# Patient Record
Sex: MALE | Race: WHITE | NOT HISPANIC OR LATINO | Employment: FULL TIME | URBAN - METROPOLITAN AREA
[De-identification: names, ages, dates, MRNs, and addresses within clinical notes are randomized per-mention and may not be internally consistent; named-entity substitution may affect disease eponyms.]

---

## 2024-08-21 ENCOUNTER — HOSPITAL ENCOUNTER (EMERGENCY)
Facility: HOSPITAL | Age: 24
Discharge: HOME | End: 2024-08-21
Attending: EMERGENCY MEDICINE
Payer: COMMERCIAL

## 2024-08-21 VITALS
RESPIRATION RATE: 18 BRPM | SYSTOLIC BLOOD PRESSURE: 111 MMHG | HEIGHT: 64 IN | BODY MASS INDEX: 23.9 KG/M2 | HEART RATE: 59 BPM | DIASTOLIC BLOOD PRESSURE: 68 MMHG | WEIGHT: 140 LBS | OXYGEN SATURATION: 97 % | TEMPERATURE: 98.4 F

## 2024-08-21 DIAGNOSIS — R55 NEAR SYNCOPE: Primary | ICD-10-CM

## 2024-08-21 DIAGNOSIS — F41.9 ANXIETY: ICD-10-CM

## 2024-08-21 LAB
ALBUMIN SERPL-MCNC: 4.7 G/DL (ref 3.5–5.2)
ALBUMIN/GLOB SERPL: 2.2 G/DL
ALP SERPL-CCNC: 44 U/L (ref 39–117)
ALT SERPL W P-5'-P-CCNC: 12 U/L (ref 1–41)
ANION GAP SERPL CALCULATED.3IONS-SCNC: 11.2 MMOL/L (ref 5–15)
AST SERPL-CCNC: 18 U/L (ref 1–40)
BASOPHILS # BLD AUTO: 0.04 10*3/MM3 (ref 0–0.2)
BASOPHILS NFR BLD AUTO: 0.5 % (ref 0–1.5)
BILIRUB SERPL-MCNC: 0.6 MG/DL (ref 0–1.2)
BUN SERPL-MCNC: 14 MG/DL (ref 6–20)
BUN/CREAT SERPL: 14.7 (ref 7–25)
CALCIUM SPEC-SCNC: 9.1 MG/DL (ref 8.6–10.5)
CHLORIDE SERPL-SCNC: 104 MMOL/L (ref 98–107)
CO2 SERPL-SCNC: 24.8 MMOL/L (ref 22–29)
CREAT SERPL-MCNC: 0.95 MG/DL (ref 0.76–1.27)
DEPRECATED RDW RBC AUTO: 39.8 FL (ref 37–54)
EGFRCR SERPLBLD CKD-EPI 2021: 113.9 ML/MIN/1.73
EOSINOPHIL # BLD AUTO: 0.08 10*3/MM3 (ref 0–0.4)
EOSINOPHIL NFR BLD AUTO: 1.1 % (ref 0.3–6.2)
ERYTHROCYTE [DISTWIDTH] IN BLOOD BY AUTOMATED COUNT: 12.3 % (ref 12.3–15.4)
GLOBULIN UR ELPH-MCNC: 2.1 GM/DL
GLUCOSE SERPL-MCNC: 101 MG/DL (ref 65–99)
HCT VFR BLD AUTO: 41.7 % (ref 37.5–51)
HGB BLD-MCNC: 14.3 G/DL (ref 13–17.7)
IMM GRANULOCYTES # BLD AUTO: 0.02 10*3/MM3 (ref 0–0.05)
IMM GRANULOCYTES NFR BLD AUTO: 0.3 % (ref 0–0.5)
LYMPHOCYTES # BLD AUTO: 1.62 10*3/MM3 (ref 0.7–3.1)
LYMPHOCYTES NFR BLD AUTO: 21.9 % (ref 19.6–45.3)
MAGNESIUM SERPL-MCNC: 1.9 MG/DL (ref 1.6–2.6)
MCH RBC QN AUTO: 30.7 PG (ref 26.6–33)
MCHC RBC AUTO-ENTMCNC: 34.3 G/DL (ref 31.5–35.7)
MCV RBC AUTO: 89.5 FL (ref 79–97)
MONOCYTES # BLD AUTO: 0.66 10*3/MM3 (ref 0.1–0.9)
MONOCYTES NFR BLD AUTO: 8.9 % (ref 5–12)
NEUTROPHILS NFR BLD AUTO: 4.99 10*3/MM3 (ref 1.7–7)
NEUTROPHILS NFR BLD AUTO: 67.3 % (ref 42.7–76)
NRBC BLD AUTO-RTO: 0 /100 WBC (ref 0–0.2)
PLATELET # BLD AUTO: 300 10*3/MM3 (ref 140–450)
PMV BLD AUTO: 9.2 FL (ref 6–12)
POTASSIUM SERPL-SCNC: 3.7 MMOL/L (ref 3.5–5.2)
PROT SERPL-MCNC: 6.8 G/DL (ref 6–8.5)
QT INTERVAL: 403 MS
QTC INTERVAL: 406 MS
RBC # BLD AUTO: 4.66 10*6/MM3 (ref 4.14–5.8)
SODIUM SERPL-SCNC: 140 MMOL/L (ref 136–145)
T4 FREE SERPL-MCNC: 1.59 NG/DL (ref 0.92–1.68)
TSH SERPL DL<=0.05 MIU/L-ACNC: 0.99 UIU/ML (ref 0.27–4.2)
WBC NRBC COR # BLD AUTO: 7.41 10*3/MM3 (ref 3.4–10.8)

## 2024-08-21 PROCEDURE — 93005 ELECTROCARDIOGRAM TRACING: CPT | Performed by: EMERGENCY MEDICINE

## 2024-08-21 PROCEDURE — 93010 ELECTROCARDIOGRAM REPORT: CPT | Performed by: INTERNAL MEDICINE

## 2024-08-21 PROCEDURE — 99283 EMERGENCY DEPT VISIT LOW MDM: CPT

## 2024-08-21 PROCEDURE — 84439 ASSAY OF FREE THYROXINE: CPT | Performed by: EMERGENCY MEDICINE

## 2024-08-21 PROCEDURE — 80050 GENERAL HEALTH PANEL: CPT | Performed by: EMERGENCY MEDICINE

## 2024-08-21 PROCEDURE — 83735 ASSAY OF MAGNESIUM: CPT | Performed by: EMERGENCY MEDICINE

## 2024-08-21 NOTE — ED TRIAGE NOTES
Patient to ED via pv from home. Patient c/o multiple near syncopal episodes that have occurred over the past 2 weeks. Patient reports he was on his way here and had another episode and had to pull over. Patient reports he had a doctor appointment today to address this issue.

## 2024-08-21 NOTE — DISCHARGE INSTRUCTIONS
Follow-up with your primary care provider soon as possible.  Return to the emergency department for worsening/persistent symptoms, fainting, chest pain, shortness of breath, or other concern.

## 2024-08-21 NOTE — ED PROVIDER NOTES
EMERGENCY DEPARTMENT ENCOUNTER    Room Number:  15/15  PCP: Sharon Fernández APRN  Historian: Patient    I initially evaluated the patient at 1530    HPI:  Chief Complaint: Near syncope  A complete HPI/ROS/PMH/PSH/SH/FH are unobtainable due to: Nothing  Context: Abdirashid Remy is a 23 y.o. male who presents to the ED c/o acute near syncope.  Patient was driving earlier today when he began to feel faint and lightheaded.  He developed tunnel vision and tingling in both hands.  He also reports that the muscles in both arms felt tight.  He felt like his heart was racing and he had some mild tightness in his chest.  He also felt somewhat short of breath.  Symptoms lasted about 30 minutes.  He had a similar episode 2 days ago and another similar episode about 2 weeks ago.  He denies headache or abdominal pain.  He denies any aggravating or alleviating factors.  He denies recent illness, cough, fever, abdominal pain, vomiting, or diarrhea.  He smokes marijuana occasionally.  Denies other drug use.  He does not take any prescription medication.            PAST MEDICAL HISTORY  Active Ambulatory Problems     Diagnosis Date Noted    No Active Ambulatory Problems     Resolved Ambulatory Problems     Diagnosis Date Noted    No Resolved Ambulatory Problems     No Additional Past Medical History         PAST SURGICAL HISTORY  History reviewed. No pertinent surgical history.      FAMILY HISTORY  History reviewed. No pertinent family history.      SOCIAL HISTORY  Social History     Socioeconomic History    Marital status: Single         ALLERGIES  Patient has no known allergies.    REVIEW OF SYSTEMS  Review of Systems  Included in HPI  All systems reviewed and negative except for those discussed in HPI.      PHYSICAL EXAM  ED Triage Vitals [08/21/24 1434]   Temp Heart Rate Resp BP SpO2   98.4 °F (36.9 °C) 101 16 153/89 98 %      Temp src Heart Rate Source Patient Position BP Location FiO2 (%)   -- -- -- -- --       Physical  Exam      GENERAL: Awake, alert, oriented x 3.  Well-developed, well-nourished male.  Resting comfortably in no acute distress  HENT: NCAT, nares patent  EYES: PERRL, EOMI  CV: regular rhythm, normal rate  RESPIRATORY: normal effort, clear to auscultation bilaterally  ABDOMEN: soft, nondistended, nontender  MUSCULOSKELETAL: Extremities are nontender with full range of motion  NEURO: Speech is normal.  No facial droop.  Normal strength and light touch sensation in all extremities.  PSYCH:  calm, cooperative  SKIN: warm, dry    Vital signs and nursing notes reviewed.          LAB RESULTS  Recent Results (from the past 24 hour(s))   Comprehensive Metabolic Panel    Collection Time: 08/21/24  3:41 PM    Specimen: Blood   Result Value Ref Range    Glucose 101 (H) 65 - 99 mg/dL    BUN 14 6 - 20 mg/dL    Creatinine 0.95 0.76 - 1.27 mg/dL    Sodium 140 136 - 145 mmol/L    Potassium 3.7 3.5 - 5.2 mmol/L    Chloride 104 98 - 107 mmol/L    CO2 24.8 22.0 - 29.0 mmol/L    Calcium 9.1 8.6 - 10.5 mg/dL    Total Protein 6.8 6.0 - 8.5 g/dL    Albumin 4.7 3.5 - 5.2 g/dL    ALT (SGPT) 12 1 - 41 U/L    AST (SGOT) 18 1 - 40 U/L    Alkaline Phosphatase 44 39 - 117 U/L    Total Bilirubin 0.6 0.0 - 1.2 mg/dL    Globulin 2.1 gm/dL    A/G Ratio 2.2 g/dL    BUN/Creatinine Ratio 14.7 7.0 - 25.0    Anion Gap 11.2 5.0 - 15.0 mmol/L    eGFR 113.9 >60.0 mL/min/1.73   Magnesium    Collection Time: 08/21/24  3:41 PM    Specimen: Blood   Result Value Ref Range    Magnesium 1.9 1.6 - 2.6 mg/dL   TSH    Collection Time: 08/21/24  3:41 PM    Specimen: Blood   Result Value Ref Range    TSH 0.990 0.270 - 4.200 uIU/mL   T4, Free    Collection Time: 08/21/24  3:41 PM    Specimen: Blood   Result Value Ref Range    Free T4 1.59 0.92 - 1.68 ng/dL   CBC Auto Differential    Collection Time: 08/21/24  3:41 PM    Specimen: Blood   Result Value Ref Range    WBC 7.41 3.40 - 10.80 10*3/mm3    RBC 4.66 4.14 - 5.80 10*6/mm3    Hemoglobin 14.3 13.0 - 17.7 g/dL     Hematocrit 41.7 37.5 - 51.0 %    MCV 89.5 79.0 - 97.0 fL    MCH 30.7 26.6 - 33.0 pg    MCHC 34.3 31.5 - 35.7 g/dL    RDW 12.3 12.3 - 15.4 %    RDW-SD 39.8 37.0 - 54.0 fl    MPV 9.2 6.0 - 12.0 fL    Platelets 300 140 - 450 10*3/mm3    Neutrophil % 67.3 42.7 - 76.0 %    Lymphocyte % 21.9 19.6 - 45.3 %    Monocyte % 8.9 5.0 - 12.0 %    Eosinophil % 1.1 0.3 - 6.2 %    Basophil % 0.5 0.0 - 1.5 %    Immature Grans % 0.3 0.0 - 0.5 %    Neutrophils, Absolute 4.99 1.70 - 7.00 10*3/mm3    Lymphocytes, Absolute 1.62 0.70 - 3.10 10*3/mm3    Monocytes, Absolute 0.66 0.10 - 0.90 10*3/mm3    Eosinophils, Absolute 0.08 0.00 - 0.40 10*3/mm3    Basophils, Absolute 0.04 0.00 - 0.20 10*3/mm3    Immature Grans, Absolute 0.02 0.00 - 0.05 10*3/mm3    nRBC 0.0 0.0 - 0.2 /100 WBC   ECG 12 Lead Syncope    Collection Time: 08/21/24  4:14 PM   Result Value Ref Range    QT Interval 403 ms    QTC Interval 406 ms       Ordered the above labs and reviewed the results.        RADIOLOGY  No Radiology Exams Resulted Within Past 24 Hours    Ordered the above noted radiological studies. Reviewed by me in PACS.            PROCEDURES  Procedures        OUTPATIENT MEDICATION MANAGEMENT:  No current Epic-ordered facility-administered medications on file.     Current Outpatient Medications Ordered in Epic   Medication Sig Dispense Refill    amitriptyline (ELAVIL) 25 MG tablet              MEDICATIONS GIVEN IN ER  Medications - No data to display                MEDICAL DECISION MAKING, PROGRESS, and CONSULTS    All labs have been independently reviewed by me.  All radiology studies have been reviewed by me and I have also reviewed the radiology report.   EKG's independently viewed and interpreted by me.  Discussion below represents my analysis of pertinent findings related to patient's condition, differential diagnosis, treatment plan and final disposition.      Additional sources:    - Discussed/ obtained information from independent historians: None    -  External (non-ED) record review: Patient saw his PCP in May 2024 for left otitis media.  He does not have any prior ED visits or admissions here.  He saw neurologist in January 2024 for follow-up for neck pain and migraines.  MRI brain done in January 2024 was normal.    -Prescription drug monitoring program review:     N/A    - Chronic or social conditions impacting patient care (Social Determinants of Health): None          Orders placed during this visit:  Orders Placed This Encounter   Procedures    Comprehensive Metabolic Panel    Magnesium    TSH    T4, Free    CBC Auto Differential    ECG 12 Lead Syncope    CBC & Differential         Additional orders considered but not ordered:          Differential diagnosis includes, but is not limited to:    Vasovagal episodes, dehydration, electrolyte abnormality, arrhythmia, anxiety      Independent interpretation of labs, radiology studies, and discussions with consultants:  ED Course as of 08/21/24 1916   Wed Aug 21, 2024   1532 BP: 153/89 [WH]   1532 Temp: 98.4 °F (36.9 °C) [WH]   1532 Heart Rate: 101 [WH]   1532 Resp: 16 [WH]   1532 SpO2: 98 % [WH]   1532 Device (Oxygen Therapy): room air [WH]   1611 WBC: 7.41 [WH]   1611 Hemoglobin: 14.3 [WH]   1617 EKG interpreted by me at 1617.  My personal interpretation is:          EKG time: 1614  Rhythm/Rate: Sinus rhythm, rate 61  P waves and AK: Short AK interval  QRS, axis: Normal  ST and T waves: Normal  QT interval: Normal    Interpreted Contemporaneously by me, independently viewed  No prior available for comparison    [WH]   1617 Magnesium: 1.9 [WH]   1618 Glucose(!): 101 [WH]   1618 BUN: 14 [WH]   1618 Creatinine: 0.95 [WH]   1618 Sodium: 140 [WH]   1628 Free T4: 1.59 [WH]   1628 TSH Baseline: 0.990 [WH]   1741 Patient is resting comfortably.  Test results and diagnoses were discussed with him.  He was advised to follow-up with his PCP.  Return precautions were discussed. [WH]   1757 MDM: Patient presented to the ED  after having 3 near syncopal episodes over the past 2 weeks.  His workup is unremarkable.  Symptoms are likely vasovagal with some underlying anxiety contributing as well. [WH]      ED Course User Index  [WH] Sukhi Doty MD         COMPLEXITY OF CARE        DIAGNOSIS  Final diagnoses:   Near syncope   Anxiety         DISPOSITION  DISCHARGE    Patient discharged in stable condition.    Reviewed implications of results, diagnosis, meds, responsibility to follow up, warning signs and symptoms of possible worsening, potential complications and reasons to return to ER, including worsening/recurrent symptoms, fainting, chest pain, shortness of breath, or other concern.    Patient/Family voiced understanding of above instructions.    Discussed plan for discharge, as there is no emergent indication for admission. Patient referred to primary care provider for BP management due to today's BP. Pt/family is agreeable and understands need for follow up and repeat testing.  Pt is aware that discharge does not mean that nothing is wrong but it indicates no emergency is present that requires admission and they must continue care with follow-up as given below or physician of their choice.     FOLLOW-UP  Sharon Fernández, APRN  [address] [address]  [phone]    Schedule an appointment as soon as possible for a visit            Medication List      No changes were made to your prescriptions during this visit.                   Latest Documented Vital Signs:  AS OF 19:16 EDT VITALS:    BP - 111/68  HR - 59  TEMP - 98.4 °F (36.9 °C)  O2 SATS - 97%            --    Please note that portions of this were completed with a voice recognition program.       Note Disclaimer: At [facility], we believe that sharing information builds trust and better relationships. You are receiving this note because you are receiving care at [facility] or recently visited. It is possible you will see [facility]  information before a provider has talked with you about it. This kind of information can be easy to misunderstand. To help you fully understand what it means for your health, we urge you to discuss this note with your provider.             Sukhi Doty MD  08/21/24 1916